# Patient Record
Sex: MALE | Race: WHITE | Employment: OTHER | URBAN - METROPOLITAN AREA
[De-identification: names, ages, dates, MRNs, and addresses within clinical notes are randomized per-mention and may not be internally consistent; named-entity substitution may affect disease eponyms.]

---

## 2021-05-02 ENCOUNTER — HOSPITAL ENCOUNTER (EMERGENCY)
Facility: CLINIC | Age: 66
Discharge: HOME | End: 2021-05-02
Attending: EMERGENCY MEDICINE | Admitting: EMERGENCY MEDICINE
Payer: MEDICARE

## 2021-05-02 VITALS
SYSTOLIC BLOOD PRESSURE: 167 MMHG | RESPIRATION RATE: 16 BRPM | DIASTOLIC BLOOD PRESSURE: 96 MMHG | HEART RATE: 64 BPM | OXYGEN SATURATION: 99 % | TEMPERATURE: 97.7 F | WEIGHT: 203 LBS

## 2021-05-02 DIAGNOSIS — S61.209A AVULSION OF FINGER TIP, INITIAL ENCOUNTER: ICD-10-CM

## 2021-05-02 PROCEDURE — 99282 EMERGENCY DEPT VISIT SF MDM: CPT | Performed by: EMERGENCY MEDICINE

## 2021-05-02 PROCEDURE — 12001 RPR S/N/AX/GEN/TRNK 2.5CM/<: CPT | Performed by: EMERGENCY MEDICINE

## 2021-05-02 PROCEDURE — 99282 EMERGENCY DEPT VISIT SF MDM: CPT | Mod: 25 | Performed by: EMERGENCY MEDICINE

## 2021-05-02 PROCEDURE — 272N000047 HC ADHESIVE DERMABOND SKIN: Performed by: EMERGENCY MEDICINE

## 2021-05-02 RX ORDER — CLOPIDOGREL BISULFATE 75 MG/1
75 TABLET ORAL DAILY
COMMUNITY

## 2021-05-02 NOTE — ED PROVIDER NOTES
"  History     Chief Complaint   Patient presents with     Laceration     HPI  Vega Alcantar is a 66 year old male who who presents to the emergency department secondary to a left index finger avulsion laceration to the fingertip.  This occurred yesterday when he was picking up some garbage and a piece of glass cut him.  He states that his tetanus vaccination is up-to-date.  He had difficulty keeping it from bleeding.  Its been washed and cleaned at home.  The bandage has occasionally had blood on it.  He noted some \"squirting\" of blood today when they change the bandage.    Allergies:  No Known Allergies    Problem List:    There are no active problems to display for this patient.       Past Medical History:    History reviewed. No pertinent past medical history.    Past Surgical History:    History reviewed. No pertinent surgical history.    Family History:    History reviewed. No pertinent family history.    Social History:  Marital Status:  Single [1]  Social History     Tobacco Use     Smoking status: Never Smoker     Smokeless tobacco: Never Used   Substance Use Topics     Alcohol use: Yes     Comment: daily     Drug use: Never        Medications:    clopidogrel (PLAVIX) 75 MG tablet          Review of Systems   All other systems reviewed and are negative.      Physical Exam   BP: (!) 167/96  Pulse: 64  Temp: 97.7  F (36.5  C)  Resp: 16  Weight: 92.1 kg (203 lb)  SpO2: 99 %      Physical Exam  Vitals signs and nursing note reviewed.   Constitutional:       General: He is not in acute distress.     Appearance: Normal appearance. He is well-developed. He is not ill-appearing or diaphoretic.   HENT:      Head: Normocephalic and atraumatic.      Right Ear: External ear normal.      Left Ear: External ear normal.      Nose: Nose normal. No congestion or rhinorrhea.   Eyes:      General: No scleral icterus.  Neck:      Musculoskeletal: Normal range of motion and neck supple.   Musculoskeletal: Normal range of " motion.         General: No swelling, tenderness or deformity.      Right lower leg: No edema.   Skin:     General: Skin is warm and dry.      Findings: No rash.      Comments: 1 cm avulsion laceration over the left index fingertip.  Its not actively bleeding.  There is approximately 3 mm wide separation.   Neurological:      General: No focal deficit present.      Mental Status: He is alert and oriented to person, place, and time.   Psychiatric:         Mood and Affect: Mood normal.         ED Course        Procedures                   No results found for this or any previous visit (from the past 24 hour(s)).    Medications - No data to display    Assessments & Plan (with Medical Decision Making)  66-year-old male who presented with an avulsion laceration to the left index fingertip.  It is greater than 24 hours old.  It looks fresh and clean.  There is no current bleeding.  The area was cleansed further and a tourniquet was placed over the finger followed by application of Dermabond.  The patient tolerated the procedure well.  I advised not to put any antibiotic ointment or Vaseline on the wound.  I also advised him not to get it wet.  Return to ER precautions and follow-up precautions and follow-up discussed.  Tube gauze was placed on the finger by staff here prior to discharge.     I have reviewed the nursing notes.    I have reviewed the findings, diagnosis, plan and need for follow up with the patient.      Discharge Medication List as of 5/2/2021  6:17 PM          Final diagnoses:   Avulsion of finger tip, initial encounter       5/2/2021   [facility] EMERGENCY DEPT     Alonso Oliver MD  05/02/21 2086

## 2022-07-13 ENCOUNTER — TRANSFERRED RECORDS (OUTPATIENT)
Dept: HEALTH INFORMATION MANAGEMENT | Facility: CLINIC | Age: 67
End: 2022-07-13

## 2023-10-09 NOTE — DISCHARGE INSTRUCTIONS
Keep wound clean and dry. Do not get wet or apply any ointment or vaseline. Return to the ER if new or worsening symptoms.  The glue will fall off with time and it should start healing prior to that. It was a pleasure to meet you.   
36.6